# Patient Record
Sex: MALE | Race: WHITE | NOT HISPANIC OR LATINO | URBAN - METROPOLITAN AREA
[De-identification: names, ages, dates, MRNs, and addresses within clinical notes are randomized per-mention and may not be internally consistent; named-entity substitution may affect disease eponyms.]

---

## 2017-02-15 ENCOUNTER — ANESTHESIA EVENT (OUTPATIENT)
Dept: PERIOP | Facility: HOSPITAL | Age: 67
End: 2017-02-15

## 2017-02-15 RX ORDER — NIACIN 500 MG
500 TABLET ORAL NIGHTLY
COMMUNITY

## 2017-02-15 RX ORDER — HYDROCODONE BITARTRATE AND ACETAMINOPHEN 5; 325 MG/1; MG/1
1 TABLET ORAL EVERY 6 HOURS PRN
COMMUNITY

## 2017-02-15 RX ORDER — BACLOFEN 10 MG/1
10 TABLET ORAL 3 TIMES DAILY
COMMUNITY

## 2017-02-15 RX ORDER — FAMOTIDINE 20 MG/1
20 TABLET, FILM COATED ORAL 2 TIMES DAILY
COMMUNITY

## 2017-02-15 RX ORDER — LATANOPROST 50 UG/ML
1 SOLUTION/ DROPS OPHTHALMIC NIGHTLY
COMMUNITY

## 2017-02-15 RX ORDER — AMPICILLIN TRIHYDRATE 250 MG
1 CAPSULE ORAL 2 TIMES DAILY
COMMUNITY

## 2017-02-15 RX ORDER — GABAPENTIN 100 MG/1
100 CAPSULE ORAL 3 TIMES DAILY
COMMUNITY

## 2017-02-15 RX ORDER — BENAZEPRIL HYDROCHLORIDE 20 MG/1
20 TABLET ORAL 2 TIMES DAILY
COMMUNITY

## 2017-02-15 RX ORDER — TERAZOSIN 2 MG/1
2 CAPSULE ORAL NIGHTLY
COMMUNITY

## 2017-02-15 RX ORDER — HYDROCHLOROTHIAZIDE 25 MG/1
12.5 TABLET ORAL 2 TIMES DAILY
COMMUNITY

## 2017-02-16 ENCOUNTER — HOSPITAL ENCOUNTER (OUTPATIENT)
Facility: HOSPITAL | Age: 67
Setting detail: HOSPITAL OUTPATIENT SURGERY
Discharge: HOME | End: 2017-02-16
Attending: ORTHOPAEDIC SURGERY | Admitting: ORTHOPAEDIC SURGERY

## 2017-02-16 ENCOUNTER — ANESTHESIA (OUTPATIENT)
Dept: PERIOP | Facility: HOSPITAL | Age: 67
End: 2017-02-16

## 2017-02-16 VITALS
WEIGHT: 195 LBS | TEMPERATURE: 97.3 F | HEART RATE: 110 BPM | HEIGHT: 71 IN | DIASTOLIC BLOOD PRESSURE: 92 MMHG | SYSTOLIC BLOOD PRESSURE: 130 MMHG | OXYGEN SATURATION: 96 % | BODY MASS INDEX: 27.3 KG/M2 | RESPIRATION RATE: 17 BRPM

## 2017-02-16 PROBLEM — S76.111A RUPTURE OF RIGHT QUADRICEPS TENDON: Status: ACTIVE | Noted: 2017-02-16

## 2017-02-16 LAB
DEPRECATED RDW RBC AUTO: 41.7 FL (ref 37–54)
ERYTHROCYTE [DISTWIDTH] IN BLOOD BY AUTOMATED COUNT: 12.9 % (ref 11.3–14.5)
HCT VFR BLD AUTO: 40.9 % (ref 38.9–50.9)
HGB BLD-MCNC: 13.9 G/DL (ref 13.1–17.5)
MCH RBC QN AUTO: 30.1 PG (ref 27–31)
MCHC RBC AUTO-ENTMCNC: 34 G/DL (ref 32–36)
MCV RBC AUTO: 88.5 FL (ref 80–99)
PLATELET # BLD AUTO: 153 10*3/MM3 (ref 150–450)
PMV BLD AUTO: 10.2 FL (ref 6–12)
POTASSIUM BLD-SCNC: 3.5 MMOL/L (ref 3.5–5.5)
RBC # BLD AUTO: 4.62 10*6/MM3 (ref 4.2–5.76)
WBC NRBC COR # BLD: 6.87 10*3/MM3 (ref 3.5–10.8)

## 2017-02-16 PROCEDURE — 25010000002 ROPIVACAINE PER 1 MG

## 2017-02-16 PROCEDURE — 25010000002 ONDANSETRON PER 1 MG: Performed by: NURSE ANESTHETIST, CERTIFIED REGISTERED

## 2017-02-16 PROCEDURE — 25010000002 MIDAZOLAM PER 1 MG: Performed by: NURSE ANESTHETIST, CERTIFIED REGISTERED

## 2017-02-16 PROCEDURE — 25010000002 PHENYLEPHRINE PER 1 ML: Performed by: NURSE ANESTHETIST, CERTIFIED REGISTERED

## 2017-02-16 PROCEDURE — 25010000002 ROPIVACAINE PER 1 MG: Performed by: NURSE ANESTHETIST, CERTIFIED REGISTERED

## 2017-02-16 PROCEDURE — 25010000003 CEFAZOLIN IN DEXTROSE 2-4 GM/100ML-% SOLUTION: Performed by: ORTHOPAEDIC SURGERY

## 2017-02-16 PROCEDURE — 25010000002 DEXAMETHASONE PER 1 MG: Performed by: NURSE ANESTHETIST, CERTIFIED REGISTERED

## 2017-02-16 PROCEDURE — 93005 ELECTROCARDIOGRAM TRACING: CPT | Performed by: ANESTHESIOLOGY

## 2017-02-16 PROCEDURE — 25010000002 FENTANYL CITRATE (PF) 100 MCG/2ML SOLUTION: Performed by: NURSE ANESTHETIST, CERTIFIED REGISTERED

## 2017-02-16 PROCEDURE — 84132 ASSAY OF SERUM POTASSIUM: CPT | Performed by: ANESTHESIOLOGY

## 2017-02-16 PROCEDURE — 85027 COMPLETE CBC AUTOMATED: CPT | Performed by: ANESTHESIOLOGY

## 2017-02-16 RX ORDER — ONDANSETRON 2 MG/ML
4 INJECTION INTRAMUSCULAR; INTRAVENOUS ONCE AS NEEDED
Status: COMPLETED | OUTPATIENT
Start: 2017-02-16 | End: 2017-02-16

## 2017-02-16 RX ORDER — SODIUM CHLORIDE 0.9 % (FLUSH) 0.9 %
1-10 SYRINGE (ML) INJECTION AS NEEDED
Status: DISCONTINUED | OUTPATIENT
Start: 2017-02-16 | End: 2017-02-16 | Stop reason: HOSPADM

## 2017-02-16 RX ORDER — FENTANYL CITRATE 50 UG/ML
50 INJECTION, SOLUTION INTRAMUSCULAR; INTRAVENOUS
Status: DISCONTINUED | OUTPATIENT
Start: 2017-02-16 | End: 2017-02-16 | Stop reason: HOSPADM

## 2017-02-16 RX ORDER — FAMOTIDINE 20 MG/1
20 TABLET, FILM COATED ORAL ONCE
Status: COMPLETED | OUTPATIENT
Start: 2017-02-16 | End: 2017-02-16

## 2017-02-16 RX ORDER — LIDOCAINE HYDROCHLORIDE 10 MG/ML
1 INJECTION, SOLUTION EPIDURAL; INFILTRATION; INTRACAUDAL; PERINEURAL ONCE
Status: COMPLETED | OUTPATIENT
Start: 2017-02-16 | End: 2017-02-16

## 2017-02-16 RX ORDER — DEXAMETHASONE SODIUM PHOSPHATE 4 MG/ML
INJECTION, SOLUTION INTRA-ARTICULAR; INTRALESIONAL; INTRAMUSCULAR; INTRAVENOUS; SOFT TISSUE AS NEEDED
Status: DISCONTINUED | OUTPATIENT
Start: 2017-02-16 | End: 2017-02-16 | Stop reason: SURG

## 2017-02-16 RX ORDER — HYDROMORPHONE HYDROCHLORIDE 1 MG/ML
0.5 INJECTION, SOLUTION INTRAMUSCULAR; INTRAVENOUS; SUBCUTANEOUS
Status: DISCONTINUED | OUTPATIENT
Start: 2017-02-16 | End: 2017-02-16 | Stop reason: HOSPADM

## 2017-02-16 RX ORDER — FAMOTIDINE 10 MG/ML
20 INJECTION, SOLUTION INTRAVENOUS ONCE
Status: DISCONTINUED | OUTPATIENT
Start: 2017-02-16 | End: 2017-02-16 | Stop reason: HOSPADM

## 2017-02-16 RX ORDER — CEFAZOLIN SODIUM 2 G/100ML
2 INJECTION, SOLUTION INTRAVENOUS ONCE
Status: COMPLETED | OUTPATIENT
Start: 2017-02-16 | End: 2017-02-16

## 2017-02-16 RX ORDER — MIDAZOLAM HYDROCHLORIDE 1 MG/ML
INJECTION INTRAMUSCULAR; INTRAVENOUS AS NEEDED
Status: DISCONTINUED | OUTPATIENT
Start: 2017-02-16 | End: 2017-02-16 | Stop reason: SURG

## 2017-02-16 RX ORDER — SODIUM CHLORIDE, SODIUM LACTATE, POTASSIUM CHLORIDE, CALCIUM CHLORIDE 600; 310; 30; 20 MG/100ML; MG/100ML; MG/100ML; MG/100ML
9 INJECTION, SOLUTION INTRAVENOUS CONTINUOUS
Status: DISCONTINUED | OUTPATIENT
Start: 2017-02-16 | End: 2017-02-16 | Stop reason: HOSPADM

## 2017-02-16 RX ORDER — ROPIVACAINE HYDROCHLORIDE 5 MG/ML
INJECTION, SOLUTION EPIDURAL; INFILTRATION; PERINEURAL AS NEEDED
Status: DISCONTINUED | OUTPATIENT
Start: 2017-02-16 | End: 2017-02-16 | Stop reason: SURG

## 2017-02-16 RX ORDER — ROPIVACAINE HYDROCHLORIDE 2 MG/ML
8 INJECTION, SOLUTION EPIDURAL; INFILTRATION CONTINUOUS
Status: DISCONTINUED | OUTPATIENT
Start: 2017-02-16 | End: 2017-02-16 | Stop reason: HOSPADM

## 2017-02-16 RX ORDER — FENTANYL CITRATE 50 UG/ML
INJECTION, SOLUTION INTRAMUSCULAR; INTRAVENOUS AS NEEDED
Status: DISCONTINUED | OUTPATIENT
Start: 2017-02-16 | End: 2017-02-16 | Stop reason: SURG

## 2017-02-16 RX ORDER — ESMOLOL HYDROCHLORIDE 10 MG/ML
INJECTION INTRAVENOUS AS NEEDED
Status: DISCONTINUED | OUTPATIENT
Start: 2017-02-16 | End: 2017-02-16 | Stop reason: SURG

## 2017-02-16 RX ORDER — SODIUM CHLORIDE, SODIUM LACTATE, POTASSIUM CHLORIDE, CALCIUM CHLORIDE 600; 310; 30; 20 MG/100ML; MG/100ML; MG/100ML; MG/100ML
100 INJECTION, SOLUTION INTRAVENOUS CONTINUOUS
Status: DISCONTINUED | OUTPATIENT
Start: 2017-02-16 | End: 2017-02-16 | Stop reason: HOSPADM

## 2017-02-16 RX ADMIN — CEFAZOLIN SODIUM 2 G: 2 INJECTION, SOLUTION INTRAVENOUS at 07:31

## 2017-02-16 RX ADMIN — SODIUM CHLORIDE, POTASSIUM CHLORIDE, SODIUM LACTATE AND CALCIUM CHLORIDE: 600; 310; 30; 20 INJECTION, SOLUTION INTRAVENOUS at 08:42

## 2017-02-16 RX ADMIN — ONDANSETRON 4 MG: 2 INJECTION INTRAMUSCULAR; INTRAVENOUS at 08:33

## 2017-02-16 RX ADMIN — ESMOLOL HYDROCHLORIDE 15 MG: 10 INJECTION, SOLUTION INTRAVENOUS at 08:21

## 2017-02-16 RX ADMIN — FAMOTIDINE 20 MG: 20 TABLET ORAL at 06:59

## 2017-02-16 RX ADMIN — DEXAMETHASONE SODIUM PHOSPHATE 8 MG: 4 INJECTION, SOLUTION INTRAMUSCULAR; INTRAVENOUS at 07:40

## 2017-02-16 RX ADMIN — ESMOLOL HYDROCHLORIDE 10 MG: 10 INJECTION, SOLUTION INTRAVENOUS at 08:51

## 2017-02-16 RX ADMIN — PHENYLEPHRINE HYDROCHLORIDE 100 MCG: 10 INJECTION INTRAVENOUS at 07:45

## 2017-02-16 RX ADMIN — PHENYLEPHRINE HYDROCHLORIDE 200 MCG: 10 INJECTION INTRAVENOUS at 07:39

## 2017-02-16 RX ADMIN — LIDOCAINE HYDROCHLORIDE 0.2 ML: 10 INJECTION, SOLUTION EPIDURAL; INFILTRATION; INTRACAUDAL; PERINEURAL at 06:59

## 2017-02-16 RX ADMIN — SODIUM CHLORIDE, POTASSIUM CHLORIDE, SODIUM LACTATE AND CALCIUM CHLORIDE: 600; 310; 30; 20 INJECTION, SOLUTION INTRAVENOUS at 07:31

## 2017-02-16 RX ADMIN — MIDAZOLAM HYDROCHLORIDE 2 MG: 1 INJECTION, SOLUTION INTRAMUSCULAR; INTRAVENOUS at 07:20

## 2017-02-16 RX ADMIN — FENTANYL CITRATE 100 MCG: 50 INJECTION, SOLUTION INTRAMUSCULAR; INTRAVENOUS at 07:20

## 2017-02-16 RX ADMIN — ROPIVACAINE HYDROCHLORIDE 20 ML: 5 INJECTION, SOLUTION EPIDURAL; INFILTRATION; PERINEURAL at 07:20

## 2017-02-16 RX ADMIN — SODIUM CHLORIDE, POTASSIUM CHLORIDE, SODIUM LACTATE AND CALCIUM CHLORIDE 9 ML/HR: 600; 310; 30; 20 INJECTION, SOLUTION INTRAVENOUS at 06:59

## 2017-02-16 NOTE — PLAN OF CARE
Problem: Perioperative Period (Adult)  Goal: Signs and Symptoms of Listed Potential Problems Will be Absent or Manageable (Perioperative Period)  Outcome: Ongoing (interventions implemented as appropriate)    02/16/17 0653   Perioperative Period   Problems Assessed (Perioperative Period) pain   Problems Present (Perioperative Period) none

## 2017-02-16 NOTE — H&P
Pre-Op H&P    Chief complaint: Right upper leg pain    HPI:    Patient is a 66 y.o.male presents with right quadriceps tendon tear and here today for repair, possible allograft.  Injury in January 2017    Review of Systems:  General ROS: negative for chills, fever or skin lesions;  No changes since last office visit  Cardiovascular ROS: no chest pain or dyspnea on exertion.  History of PAF.  Follows with cardiologist Dr. Taylor in [place].  EKG today with Afib, hr 122, but rate has been controlled in preop in 80-90's with stable bp and asymptomatic.  Provided pt/wife with EKG and orders to follow up with Cardiologist in the next few days following discharge for possible medication adjustment  Respiratory ROS: no cough, shortness of breath, or wheezing    Allergies: No Known Allergies    Home Meds    Prescriptions Prior to Admission   Medication Sig Dispense Refill Last Dose   • baclofen (LIORESAL) 10 MG tablet Take 10 mg by mouth 3 (Three) Times a Day.   2/16/2017 at 0430   • benazepril (LOTENSIN) 20 MG tablet Take 20 mg by mouth 2 (Two) Times a Day.   2/16/2017 at 0500   • famotidine (PEPCID) 20 MG tablet Take 20 mg by mouth 2 (Two) Times a Day.   2/16/2017 at 0500   • gabapentin (NEURONTIN) 100 MG capsule Take 100 mg by mouth 3 (Three) Times a Day.   2/16/2017 at 0500   • hydrochlorothiazide (HYDRODIURIL) 25 MG tablet Take 12.5 mg by mouth 2 (Two) Times a Day.   2/15/2017 at 2100   • HYDROcodone-acetaminophen (NORCO) 5-325 MG per tablet Take 1 tablet by mouth Every 6 (Six) Hours As Needed.   2/15/2017 at 2100   • latanoprost (XALATAN) 0.005 % ophthalmic solution Administer 1 drop to both eyes Every Night.   2/15/2017 at 2100   • metoprolol tartrate (LOPRESSOR) 25 MG tablet Take 12.5 mg by mouth Daily.   2/16/2017 at 0500   • Multiple Vitamins-Minerals (MULTIVITAMIN ADULT PO) Take 1 tablet by mouth Daily.   2/15/2017 at 0800   • Multiple Vitamins-Minerals (OCUVITE PO) Take 1 tablet by mouth Daily.   2/15/2017  "at 0800   • niacin 500 MG tablet Take 500 mg by mouth Every Night.   2/15/2017 at 2100   • Potassium 99 MG tablet Take 1 tablet by mouth Daily.   2/15/2017 at 2100   • Red Yeast Rice 600 MG capsule Take 1 tablet by mouth 2 (Two) Times a Day.   2/15/2017 at 2100   • Saw [place], Serenoa repens, 450 MG capsule Take 1 tablet by mouth 2 (Two) Times a Day.   2/15/2017 at 2100   • terazosin (HYTRIN) 2 MG capsule Take 2 mg by mouth Every Night.   2/16/2017 at 0500       PMH:   Past Medical History   Diagnosis Date   • Arthritis    • Atrial fibrillation    • Hypertension      PSH:    Past Surgical History   Procedure Laterality Date   • Back surgery  02/08/2017     Lower back   • Cataract extraction Bilateral    • Tonsillectomy         Immunization History:  Influenza: yes 2016  Pneumococcal: yes < 5 years  Tetanus: unknown    Social History:   Tobacco:   History   Smoking Status   • Never Smoker   Smokeless Tobacco   • Not on file      Alcohol:   History   Alcohol Use No       Physical Exam:  Visit Vitals   • /75 (BP Location: Right arm, Patient Position: Lying)   • Pulse 97   • Temp 97.7 °F (36.5 °C) (Temporal Artery )   • Resp 12   • Ht 71\" (180.3 cm)   • Wt 195 lb (88.5 kg)   • SpO2 94%   • BMI 27.2 kg/m2       General Appearance:    Alert, cooperative, no distress, appears stated age   Head:    Normocephalic, without obvious abnormality, atraumatic   Lungs:     Clear to auscultation bilaterally, respirations unlabored    Heart:   Irregular rate and rhythm, S1 and S2 normal, no murmur, rub    or gallop    Abdomen:    Soft, non-tender.  +bowel sounds   Breast Exam:    deferred   Genitalia:    deferred   Extremities:   Extremities normal, atraumatic, no cyanosis or edema   Skin:   Skin color, texture, turgor normal, no rashes or lesions   Neurologic:   Grossly intact   Results Review  I reviewed the patient's new clinical results.    Cancer Staging (if applicable)  Cancer Patient: __ yes _x_no __unknown; If yes, " clinical stage T:__ N:__M:__, stage group    Impression: Right quadriceps tendon tear    Plan: Right quadriceps tendon repair, possible allograft    Mckenna López, APRN 2/16/2017 6:51 AM

## 2017-02-16 NOTE — OP NOTE
DATE OF PROCEDURE:  02/16/2017    PREOPERATIVE DIAGNOSIS: Right subacute quadriceps tendon tear.     POSTOPERATIVE DIAGNOSIS:  Right subacute quadriceps tendon tear.    PROCEDURE PERFORMED: Right quadriceps tendon repair.     SURGEON: Simon Hinojosa MD    ASSISTANT:  YARELI Syed     Nurse assistant was necessary during the case for positioning the patient, exposure, assistance with repair of the tendon and closure.     ANESTHESIA: General with femoral block with On-Q catheter placement.      ESTIMATED BLOOD LOSS:  Minimal.     COMPLICATIONS: None.     SPECIMENS: None.     IMPLANTS USED:  A #5 FiberWire suture x2 for the quadriceps tendon repair.      TOURNIQUET TIME: 62 minutes at 300 mmHg.     INDICATIONS FOR PROCEDURE: The patient is a very pleasant 66-year-old male who injured his right knee about 5 weeks ago when he fell on some steps. He saw an orthopedic surgeon in [place] after this injury. MRI revealed a quadriceps tendon tear with about 2 cm of retraction. He was placed into a knee immobilizer.  Orthopedic surgeon there, by the patient's report, advised him to get this fixed with an about 6 weeks or so, as he had a more acute issue of a lumbar herniated disk.  He underwent surgery for this a couple of weeks ago and is recovering well from that standpoint. He saw me in the office last week.  History exam was consistent with an acute quadriceps tendon tear. I advised him since this was just over a month out from surgery, I have allograft available if necessary for the repair, but we were not able to repair it primarily.  All the risks, benefits, and alternatives to surgery were discussed in detail and he agreed to proceed and a surgical consent form was signed.     DESCRIPTION OF PROCEDURE: He was seen in the preoperative holding area. The right lower extremity was marked to confirm the correct operative site. He was seen by Anesthesia.  He received Ancef 2 g IV and prophylactic antibiotics within 1  hour of incision time. Anesthesia performed a femoral block with catheter placement using ultrasound guidance without difficulty preoperatively.  He was brought back to the operating room. General anesthesia was induced without difficulty. A nonsterile tourniquet was applied to the right thigh. The right lower extremity was then prepped and draped in the usual sterile fashion. A timeout was performed to confirm right knee quadriceps tendon repair and the patient.  He was placed in the triangle. The right upper extremity was exsanguinated with Esmarch.  The tourniquet was inflated to 300 mmHg.  I made a midline incision just distal to the distal pole of the patella to a couple of inches proximal to the patella, this was carried down through subcutaneous tissue. Adequate hemostasis was maintained with Bovie electrocautery. Full-thickness medial and lateral flaps were elevated. The very superficial layer of the quadriceps and fascia was intact with the vastus intermedius. This was split longitudinally and we then encountered the main quadriceps tendon rupture. This was scarred in.  We used a combination of a fresh #10 blade scalpel and blunt dissection to release adhesions.  We were able to get length back to the quadriceps tendon and get it back to the distal pole of the patella with the knee extended.  The quadriceps tendon tear tissue was freshened up.  All scar tissue was debrided.  The proximal pole of the patella was debrided with rongeur back to healthy bony bleeding bed of tissue for repair of the quadriceps tendon.  We then placed two #5 FiberWire sutures in a running locking [place] fashion in the  medial and lateral aspects of the quadriceps tendon tear for a total of 4 suture limbs coming up the distal end of the tendon. We then used a Yemih guide pin to drill 3 drill holes in the patella passing the most medial #5 FiberWire through the medial hole, the 2 central FiberWire sutures through the central hole  and the most lateral FiberWire through the lateral hole. We then held the knee in extension and tied the 2 suture limbs together, which brought down the quadriceps tendon to the proximal pole of the patella nicely with no gap seen. The suture limbs were then cut and the knots were buried in the patellar tendon to avoid knot irritation.  We then tested the knee and flexing it up to 30° to 45° there was no gapping at the repair site. Satisfied with the repair, we then copiously irrigated the knee with ortho irrigation.  We then repaired the split in the running retinaculum on the lateral aspect of the knee with 1-0 Vicryl. The longitudinal incisions and the superficial fascia and vastus intermedius were repaired with #1 Vicryl as well. The dermis was then repaired with 2-0 Vicryl and Stratafix barbed 3-0 Monocryl suture subcuticular stitch for the skin.  We then applied sterile dressing with Prineo mesh dressing, Dermabond. Once this was dried, we applied a sterile dressing of 4 x 4's, ABDs, soft roll and a 6-inch Ace bandage. The tourniquet was inflated for 62 minutes. He was awoken from anesthesia without difficulty, transferred to recovery room in stable condition. All sponge and needle counts were correct x2.     PLAN:   1.  He will be discharged home from recovery if he is doing.    2.  He will resume his aspirin which he normally takes for AFib today for DVT prophylaxis, that is an adult-strength dose aspirin. He already has pain medicine from his back surgery to take as needed.    3.  He will remain in his knee immobilizer at all times unless he is bathing or changing clothes,   and during those times he is to keep his knee mostly straight.  4.  He may be weight-bearing as tolerated in the knee immobilizer.   5.  We will see him back in the office a week from Monday.  He is to call [phone] with any questions or concerns, in the meantime.        MD FRANCES Jarquin/betty  DD: 02/16/2017  09:32:00  DT: 02/16/2017 10:23:10  Voice Rec. ID #[id number]  Voice Original ID #[id number]  Doc ID #[id number]  Rev. #1  cc:

## 2017-02-16 NOTE — BRIEF OP NOTE
QUADRICEPS TENDON REPAIR  Procedure Note    Saqib Rees  2/16/2017    Pre-op Diagnosis:   Right quadriceps tendon tear, subacute    Post-op Diagnosis:     Post-Op Diagnosis Codes:     * Quadriceps tendon rupture, right, initial encounter [S76.111A]    Procedure/CPT® Codes:  VA FIX QUAD/HAMSTR MUSC RUPT,PRIMARY [55237]    Procedure(s):  RIGHT QUADRICEPS TENDON REPAIR     Surgeon(s):  Simon Hinojosa MD     Asst: YARELI Lee    Anesthesia: General with Block    Staff:   Circulator: Malcom Alejandre RN  Scrub Person: Trena Yates  Nursing Assistant: Rafiq Romano  Assistant: YARELI Okeefe    Estimated Blood Loss: 0 mL    Specimens:                * No specimens in log *      Drains:none           Findings: rupture quadriceps tendon    Complications: none    Tourniquet time: 62 minutes @ 300mmHg      Simon Hinojosa MD     Date: 2/16/2017  Time: 9:33 AM

## 2017-02-16 NOTE — PLAN OF CARE
Problem: Patient Care Overview (Adult)  Goal: Plan of Care Review  Outcome: Outcome(s) achieved Date Met:  02/16/17  Goal: Adult Individualization and Mutuality  Outcome: Outcome(s) achieved Date Met:  02/16/17  Goal: Discharge Needs Assessment  Outcome: Outcome(s) achieved Date Met:  02/16/17    Problem: Perioperative Period (Adult)  Goal: Signs and Symptoms of Listed Potential Problems Will be Absent or Manageable (Perioperative Period)  Outcome: Outcome(s) achieved Date Met:  02/16/17

## 2017-02-16 NOTE — NURSING NOTE
Acute Pain Service:  On-Q teaching completed with patient's spouse.  Video demonstration, handout and bracelet provided with [facility] on call central phone number.  Instructed to call with any questions or concerns.  Patient verbalized understanding.  Service will continue to follow until catheter DC'd.  Please contact patient at [phone] if needed.

## 2017-02-18 NOTE — PROGRESS NOTES
ANNE Gavin    Nerve Cath Post Op Call    Patient Name: Saqib Rees  :  1950  MRN:  [id number]  Date of Discharge: 2017    Nerve Cath Post Op Call:    Catheter Plan:Patient called/No answer/Message left to call [facility] pain service for any questions or complaints

## 2017-02-20 NOTE — PROGRESS NOTES
ANNE Gavin    Nerve Cath Post Op Call    Patient Name: Saqib Rees  :  1950  MRN:  [id number]  Date of Discharge: 2017    Nerve Cath Post Op Call:    Catheter Plan:Patient/Family member report nerve catheter previously discontinued, tip intact

## 2021-01-14 ENCOUNTER — IMMUNIZATION (OUTPATIENT)
Dept: VACCINE CLINIC | Facility: HOSPITAL | Age: 71
End: 2021-01-14

## 2021-01-14 PROCEDURE — 0011A: CPT | Performed by: INTERNAL MEDICINE

## 2021-01-14 PROCEDURE — 91301 HC SARSCO02 VAC 100MCG/0.5ML IM: CPT | Performed by: INTERNAL MEDICINE

## 2021-02-09 ENCOUNTER — IMMUNIZATION (OUTPATIENT)
Dept: VACCINE CLINIC | Facility: HOSPITAL | Age: 71
End: 2021-02-09

## 2021-02-09 PROCEDURE — 91301 HC SARSCO02 VAC 100MCG/0.5ML IM: CPT | Performed by: INTERNAL MEDICINE

## 2021-02-09 PROCEDURE — 0012A: CPT | Performed by: INTERNAL MEDICINE

## (undated) DEVICE — 3M™ TEGADERM™ CHG DRESSING 25/CARTON 4 CARTONS/CASE 1658: Brand: TEGADERM™

## (undated) DEVICE — DRSNG PAD ABD 8X10IN STRL

## (undated) DEVICE — PK EXTREM LOWR 10

## (undated) DEVICE — ENCORE® LATEX MICRO SIZE 8, STERILE LATEX POWDER-FREE SURGICAL GLOVE: Brand: ENCORE

## (undated) DEVICE — GLV SURG BIOGEL LTX PF 8

## (undated) DEVICE — SPNG GZ WOVN 4X4IN 12PLY 10/BX STRL

## (undated) DEVICE — ST NERV BLCK CONT CONTIPLEX ECHO CLSD 18G 4IN

## (undated) DEVICE — MEDI-VAC NON-CONDUCTIVE SUCTION TUBING: Brand: CARDINAL HEALTH

## (undated) DEVICE — BNDG ELAS W/CLIP 6IN 10YD LF STRL

## (undated) DEVICE — UNDERCAST PADDING: Brand: DEROYAL

## (undated) DEVICE — SYS SKIN CLS DERMABOND PRINEO W/22CM MESH TP

## (undated) DEVICE — NERVE BLOCK SUPPORT KIT/BLUE: Brand: MEDLINE INDUSTRIES, INC.

## (undated) DEVICE — KT PUMP PAIN ONQ CBLOC SELCTAFLO 400ML

## (undated) DEVICE — SOL LR 1000ML

## (undated) DEVICE — DRAPE,TOP,102X53,STERILE: Brand: MEDLINE

## (undated) DEVICE — CANNULA,ADULT,SOFT-TOUCH,7TUBE,SC: Brand: MEDLINE

## (undated) DEVICE — GOWN,REINF,POLY,ECL,PP SLV,XL: Brand: MEDLINE

## (undated) DEVICE — INTENDED USE FOR SURGICAL MARKING ON INTACT SKIN, ALSO PROVIDES A PERMANENT METHOD OF IDENTIFYING OBJECTS IN THE OPERATING ROOM: Brand: WRITESITE® REGULAR TIP SKIN MARKER

## (undated) DEVICE — MEDI-VAC YANKAUER SUCTION HANDLE W/BULBOUS TIP: Brand: CARDINAL HEALTH

## (undated) DEVICE — STRAP STIRUP WO/RNG 19X3.5IN DISP

## (undated) DEVICE — CVR HNDL LT SURG ACCSSRY BLU STRL

## (undated) DEVICE — FLTR HME STR UNIV W/SMPL PORT

## (undated) DEVICE — AIRWY SZ11